# Patient Record
(demographics unavailable — no encounter records)

---

## 2025-01-23 NOTE — HISTORY OF PRESENT ILLNESS
[postmenopausal] : postmenopausal [Y] : Positive pregnancy history [Mammogramdate] : 10/24 [TextBox_19] : Z&P [ColonoscopyDate] : 2020 [LMPDate] :  [PGHxTotal] : 2 [[facility] : 2 [[facility]] : 2 [FreeTextEntry1] : 2

## 2025-01-23 NOTE — DISCUSSION/SUMMARY
[FreeTextEntry1] : 76-year-old para 2 here for annual GYN visit and pessary change  Pessary changed and replaced.  No need for Pap.  Up-to-date with mammogram per report no record in chart.  Has Rx from [facility] from last visit.  Patient refused breast exam today reviewed necessary for breast cancer evaluation, will need to get mammogram.  Follow-up 6 months for pessary change.  Discussed again with patient and family member that pessary does not need to be replaced, current pessary removed and cleaned and put in situ.

## 2025-01-23 NOTE — PHYSICAL EXAM
[TextEntry] : General appearance well-appearing no acute distress  Breast exam patient refused  Normal external genitalia  Ring pessary without support removed, cleaned with Betadine and soap. ring pessary (without support) in clear silicone rubber easily removed speculum inserted and cystocele and mild vaginal prolapse noted. no ulceration or abnormal discharge or blood noted pessary removed and cleaned and trimosan cream placed , and pessary replaced without difficulty  Bimanual exam performed. Anteverted uterus nontender no cervical motion tenderness no adnexal masses bilaterally, no adnexal tenderness bilaterally  Rectal exam patient refused

## 2025-07-02 NOTE — HISTORY OF PRESENT ILLNESS
[FreeTextEntry1] : 78 yo  here for pessary cleaning family member translating pt states pessary is comfortable, no VB, no discharge

## 2025-07-02 NOTE — PHYSICAL EXAM
[TextEntry] : Ring pessary without support removed, cleaned with Betadine and soap. ring pessary (without support) in clear silicone rubber easily removed speculum inserted and vaginal polypoid tissue with some bleeding noted at 9oclock, wiped away with scoptte, pressure held, no active bleeidng noted. cystocele and mild vaginal prolapse noted.no abnormal discharge  pessary removed and cleaned and trimosan cream placed , and pessary replaced without difficulty

## 2025-07-02 NOTE — PLAN
[FreeTextEntry1] : pessary replaced. d/w family member may have bleeding over next week or so. if bleeding more than for 2 weeks then will need pessary holiday. advised to call if has heavy bleeding or bleeding for more than 2 weeks.  vaginal polyp noted with some friability (9oclock). f/u 3 months